# Patient Record
Sex: FEMALE | Race: WHITE | NOT HISPANIC OR LATINO | Employment: FULL TIME | URBAN - METROPOLITAN AREA
[De-identification: names, ages, dates, MRNs, and addresses within clinical notes are randomized per-mention and may not be internally consistent; named-entity substitution may affect disease eponyms.]

---

## 2021-11-05 ENCOUNTER — OFFICE VISIT (OUTPATIENT)
Dept: URGENT CARE | Facility: URGENT CARE | Age: 36
End: 2021-11-05
Payer: COMMERCIAL

## 2021-11-05 VITALS
DIASTOLIC BLOOD PRESSURE: 80 MMHG | OXYGEN SATURATION: 100 % | RESPIRATION RATE: 16 BRPM | HEART RATE: 90 BPM | SYSTOLIC BLOOD PRESSURE: 120 MMHG

## 2021-11-05 DIAGNOSIS — L03.90 CELLULITIS, UNSPECIFIED CELLULITIS SITE: ICD-10-CM

## 2021-11-05 DIAGNOSIS — N76.0 LABIAL INFECTION: Primary | ICD-10-CM

## 2021-11-05 PROCEDURE — 99203 OFFICE O/P NEW LOW 30 MIN: CPT | Performed by: PHYSICIAN ASSISTANT

## 2021-11-05 RX ORDER — ESCITALOPRAM OXALATE 10 MG/1
10 TABLET ORAL DAILY
COMMUNITY
Start: 2021-02-12

## 2021-11-05 RX ORDER — BUPROPION HYDROCHLORIDE 150 MG/1
300 TABLET ORAL
COMMUNITY
Start: 2021-10-04 | End: 2022-10-04

## 2021-11-05 RX ORDER — MUPIROCIN 20 MG/G
OINTMENT TOPICAL 3 TIMES DAILY
Qty: 30 G | Refills: 0 | Status: SHIPPED | OUTPATIENT
Start: 2021-11-05 | End: 2021-11-12

## 2021-11-05 RX ORDER — LEVONORGESTREL/ETHIN.ESTRADIOL 0.1-0.02MG
TABLET ORAL
COMMUNITY
Start: 2021-01-15

## 2021-11-05 NOTE — PROGRESS NOTES
Assessment & Plan     Labial infection  Stop poking or squeezing  Motrin for inflammation and pain  bactroban ointment  - mupirocin (BACTROBAN) 2 % external ointment; Apply topically 3 times daily for 7 days  - amoxicillin-clavulanate (AUGMENTIN) 875-125 MG tablet; Take 1 tablet by mouth 2 times daily for 10 days    Cellulitis, unspecified cellulitis site  augmentin for infection  Sitz baths  Motrin  Follow up as needed  - mupirocin (BACTROBAN) 2 % external ointment; Apply topically 3 times daily for 7 days  - amoxicillin-clavulanate (AUGMENTIN) 875-125 MG tablet; Take 1 tablet by mouth 2 times daily for 10 days    Review of external notes as documented elsewhere in note    No follow-ups on file.    Sy Nagy PA-C  [facility] GEE Orozco is a 36 year old who presents for the following health issues     HPI     Left labial infection    Review of Systems   Constitutional, HEENT, cardiovascular, pulmonary, gi and gu systems are negative, except as otherwise noted.      Objective    /80   Pulse 90   Resp 16   SpO2 100%   There is no height or weight on file to calculate BMI.  Physical Exam   GENERAL: healthy, alert and no distress  CV: regular rate and rhythm, normal S1 S2, no S3 or S4, no murmur, click or rub, no peripheral edema and peripheral pulses strong  ABDOMEN: soft, nontender, no hepatosplenomegaly, no masses and bowel sounds normal   (female): positive for indurated and erythematous swelling in left labia majora  SKIN: positive for infection of left labia

## 2023-07-25 ENCOUNTER — HOSPITAL ENCOUNTER (EMERGENCY)
Facility: CLINIC | Age: 38
Discharge: HOME | End: 2023-07-25
Attending: EMERGENCY MEDICINE | Admitting: EMERGENCY MEDICINE
Payer: COMMERCIAL

## 2023-07-25 VITALS
RESPIRATION RATE: 16 BRPM | HEART RATE: 100 BPM | DIASTOLIC BLOOD PRESSURE: 77 MMHG | TEMPERATURE: 97.9 F | OXYGEN SATURATION: 97 % | SYSTOLIC BLOOD PRESSURE: 132 MMHG

## 2023-07-25 DIAGNOSIS — T78.40XA ALLERGIC REACTION, INITIAL ENCOUNTER: ICD-10-CM

## 2023-07-25 PROCEDURE — 96375 TX/PRO/DX INJ NEW DRUG ADDON: CPT

## 2023-07-25 PROCEDURE — 96374 THER/PROPH/DIAG INJ IV PUSH: CPT

## 2023-07-25 PROCEDURE — 250N000011 HC RX IP 250 OP 636: Mod: JZ | Performed by: EMERGENCY MEDICINE

## 2023-07-25 PROCEDURE — 99284 EMERGENCY DEPT VISIT MOD MDM: CPT | Mod: 25

## 2023-07-25 RX ORDER — PREDNISONE 20 MG/1
TABLET ORAL
Qty: 6 TABLET | Refills: 0 | Status: SHIPPED | OUTPATIENT
Start: 2023-07-25

## 2023-07-25 RX ORDER — METHYLPREDNISOLONE SODIUM SUCCINATE 125 MG/2ML
125 INJECTION, POWDER, LYOPHILIZED, FOR SOLUTION INTRAMUSCULAR; INTRAVENOUS ONCE
Status: COMPLETED | OUTPATIENT
Start: 2023-07-25 | End: 2023-07-25

## 2023-07-25 RX ORDER — DIPHENHYDRAMINE HYDROCHLORIDE 50 MG/ML
50 INJECTION INTRAMUSCULAR; INTRAVENOUS ONCE
Status: COMPLETED | OUTPATIENT
Start: 2023-07-25 | End: 2023-07-25

## 2023-07-25 RX ADMIN — FAMOTIDINE 20 MG: 10 INJECTION INTRAVENOUS at 16:23

## 2023-07-25 RX ADMIN — DIPHENHYDRAMINE HYDROCHLORIDE 50 MG: 50 INJECTION, SOLUTION INTRAMUSCULAR; INTRAVENOUS at 16:23

## 2023-07-25 RX ADMIN — METHYLPREDNISOLONE SODIUM SUCCINATE 125 MG: 125 INJECTION, POWDER, FOR SOLUTION INTRAMUSCULAR; INTRAVENOUS at 16:24

## 2023-07-25 ASSESSMENT — ACTIVITIES OF DAILY LIVING (ADL): ADLS_ACUITY_SCORE: 35

## 2023-07-25 NOTE — DISCHARGE INSTRUCTIONS
Take benadryl/Diphenhydramine 50 mg (2 tablets) every 4-6 hours today and tonight, then as needed    Take zyrtec 10 mg daily (start today) X 5 days    Take Pepcid/Famotidine 20 mg twice a day X 3 days

## 2023-07-25 NOTE — ED TRIAGE NOTES
Arrives to triage with allergic reaction involving hands feet and trunk feeling itchy with hives. Feels some numbness in tongue. Ingested a smoothie at about 245   Triage Assessment       Row Name 07/25/23 1612       Triage Assessment (Adult)    Airway WDL WDL    Additional Documentation Breath Sounds (Group)       Respiratory WDL    Respiratory WDL WDL       Skin Circulation/Temperature WDL    Skin Circulation/Temperature WDL X  hives arms, hands, trunk       Cardiac WDL    Cardiac WDL WDL       Peripheral/Neurovascular WDL    Peripheral Neurovascular WDL WDL       Cognitive/Neuro/Behavioral WDL    Cognitive/Neuro/Behavioral WDL WDL

## 2023-07-25 NOTE — ED NOTES
Tele-PIT/Intake Evaluation      Video-Visit Details    Type of service:  Video Visit    Video Start Time (time video started): 4:12 PM  Video End Time (time video stopped): 4:16 PM   Originating Location (pt. Location):  [facility]  Distant Location (provider location):  [facility]  Mode of Communication:  Video Conference via [employer]  Patient verbally consented to [employer] televisit.    History:  Patient is a 37-year-old female who presents with an allergic reaction.  She had a smoothie at 2:45 PM and then quickly after started to develop itchiness to her hands and feet.  She notes that she decided to leave work given the development of hives and on the way home developed numbness in the mouth around 3:20 PM.  She then presented to the emergency department but has not taken any medications.  Has not had any prior medication allergies.  Is not sure what may have been in the smoothie that led to this reaction.  She notes that right now she feels this numbness in the throat and itchiness to the arms and legs.  No shortness of breath or difficulty swallowing.  No chest pain.      Exam:  Patient Vitals for the past 24 hrs:   BP Temp Temp src Pulse Resp SpO2   07/25/23 1614 132/77 97.9  F (36.6  C) Temporal 100 16 97 %     General: Resting comfortably on the gurney  Head:  The scalp, face, and head appear normal  Eyes:  The pupils are normal    Conjunctivae and sclera appear normal  ENT:    The nose is normal    Ears/pinnae are normal  Neck:  Normal range of motion  MS:  No deformities.   Skin:  Hives present to forearms.  Neuro:  Speech is normal and fluent  Psych: Awake. Alert.  Normal affect.      Appropriate interactions       Appropriate interventions for symptom management were initiated if applicable.  Appropriate diagnostic tests were initiated if indicated.    Important information for subsequent clinician:  Patient is a 37-year-old female who presents with an allergic  response/reaction.  Hives present to upper arms.  We will plan to start with Solu-Medrol, Benadryl, and famotidine.  At this time patient does not have any indication for anaphylaxis or need for IM epinephrine.  Although I did not see the patient in person I did have the nurse evaluate the oropharynx and there was no reported uvular edema or swelling to the intraoral tissues.  Continue to monitor and patient will represent to the  if she starts to develop difficulty swallowing or breathing or have any vomiting or development of nausea.    I briefly evaluated the patient and developed an initial plan of care. I discussed this plan and explained that this brief interaction does not constitute a full evaluation. Patient/family understands that they should wait to be fully evaluated and discuss any test results with another clinician prior to leaving the hospital.       Nicholas Curiel MD  07/25/23 [id number]       Nicholas Curiel MD  07/25/23 8616

## 2023-07-25 NOTE — ED PROVIDER NOTES
History     Chief Complaint:  Allergic Reaction       The history is provided by the patient.      Pam Monteiro is a 37 year old female who presents with an Allergic reaction. Patient reports today approximately at 2:45 PM she drank a protein shake from [employer] and 15 minutes later she had an allergic reaction. Patient reports her body became itchy and she started to develop hives on her legs, arms and abdomen. She reports her hives are now gone. Patient denies experiencing an allergic reaction before.  She denies any new medications or detergents or soaps.      Independent Historian:   None - Patient Only    Review of External Notes:   none       Medications:    Wellbutrin  Lexapro  Prozac  Cholecalciferol     Past Medical History:    The patient denies any significant past medical history.     Past Surgical History:    Hysteroscopy   Pelvic laparoscopy  C section     Physical Exam   Patient Vitals for the past 24 hrs:   BP Temp Temp src Pulse Resp SpO2   07/25/23 1614 132/77 97.9  F (36.6  C) Temporal 100 16 97 %        Physical Exam  Nursing note and vitals reviewed.  Constitutional:  Appears well-developed and well-nourished.   HENT:   Head:    Atraumatic.   Mouth/Throat:   Oropharynx is clear and moist. No oropharyngeal exudate.   Eyes:    Pupils are equal, round, and reactive to light.   Neck:    Normal range of motion. Neck supple.      No tracheal deviation present. No thyromegaly present.   Cardiovascular:  Normal rate, regular rhythm, no murmur   Pulmonary/Chest: Breath sounds are clear and equal without wheezes or crackles.  Abdominal:   Soft. Bowel sounds are normal. Exhibits no distension and      no mass. There is no tenderness.      There is no rebound and no guarding.   Musculoskeletal:  Exhibits no edema.   Lymphadenopathy:  No cervical adenopathy.   Neurological:   Alert and oriented to person, place, and time.   Skin:    Skin is warm and dry. No rash noted. No pallor.      Emergency  Department Course     Laboratory:  Labs Ordered and Resulted from Time of ED Arrival to Time of ED Departure - No data to display         Emergency Department Course & Assessments:         Interventions:  Medications   diphenhydrAMINE (BENADRYL) injection 50 mg (50 mg Intravenous $Given 7/25/23 1623)   famotidine (PEPCID) injection 20 mg (20 mg Intravenous $Given 7/25/23 1623)   methylPREDNISolone sodium succinate (solu-MEDROL) injection 125 mg (125 mg Intravenous $Given 7/25/23 1624)        Assessments:  1747 I obtained history and examined the patient as noted above.     Independent Interpretation (X-rays, CTs, rhythm strip):  None    Consultations/Discussion of Management or Tests:  None        Social Determinants of Health affecting care:   None    Disposition:  The patient was discharged to home.     Impression & Plan    CMS Diagnoses: None    Medical Decision Making:  This patient to have an acute allergic reaction to a protein shake that she drank shortly prior to the reaction started.  Her symptoms are currently resolved after the above treatments and she is feeling back to normal.  She was prescribed prednisone pills to take for 3 days and told to continue Benadryl and instructed on its use.  She was told to take Pepcid and Zyrtec for the next 3 to 5 days also.  She was told to follow-up in her regular clinic and she was given signs and symptoms to return for.    Diagnosis:    ICD-10-CM    1. Allergic reaction, initial encounter  T78.40XA            Discharge Medications:  Discharge Medication List as of 7/25/2023  6:00 PM        START taking these medications    Details   predniSONE (DELTASONE) 20 MG tablet Take two tablets (= 40mg) each day for 3 (three) days, Disp-6 tablet, R-0, E-Prescribe                Scribe Disclosure:  NATALIIA GONG PRINCESS, am serving as a scribe at 5:51 PM on 7/25/2023 to document services personally performed by Verito Jeffery MD based on my observations and the provider's  statements to me.     7/25/2023   Verito Jeffery MD [place], Cheri Lee, MD  07/25/23 1928